# Patient Record
Sex: MALE | Race: BLACK OR AFRICAN AMERICAN
[De-identification: names, ages, dates, MRNs, and addresses within clinical notes are randomized per-mention and may not be internally consistent; named-entity substitution may affect disease eponyms.]

---

## 2020-08-14 ENCOUNTER — HOSPITAL ENCOUNTER (EMERGENCY)
Dept: HOSPITAL 41 - JD.ED | Age: 30
Discharge: HOME | End: 2020-08-14
Payer: SELF-PAY

## 2020-08-14 DIAGNOSIS — M62.830: Primary | ICD-10-CM

## 2020-08-14 PROCEDURE — 71046 X-RAY EXAM CHEST 2 VIEWS: CPT

## 2020-08-14 PROCEDURE — 99283 EMERGENCY DEPT VISIT LOW MDM: CPT

## 2020-08-14 NOTE — CR
Chest: 2 views of the chest were obtained.

 

Comparison: No prior chest imaging is available.

 

Heart size and mediastinum are normal.  Lungs are clear with no acute 

parenchymal change.  Bony structures appear within normal limits.

 

Impression:

1.  Nothing acute is seen on 2 view chest x-ray.

 

Diagnostic code #1

 

This report was dictated in MDT

## 2020-08-14 NOTE — EDM.PDOC
ED HPI GENERAL MEDICAL PROBLEM





- General


Chief Complaint: Back Pain or Injury


Stated Complaint: BACK PAIN  & STOMACH PAIN


Time Seen by Provider: 08/14/20 19:04


Source of Information: Reports: Patient


History Limitations: Reports: No Limitations





- History of Present Illness


INITIAL COMMENTS - FREE TEXT/NARRATIVE: 





Mr. Vega is a very pleasant 30-year-old gentleman with no chronic medical 

problems and no past surgical history, who now presents to the ED stating that 

he has been experiencing mild, intermittent pain felt between his scapula since 

Wednesday, 8/12/2020.  The pain only occurs if he is reaching or, sometimes, 

when he is standing, but not if he is supine or resting.  His pain is not made 

worse if he takes a deep breath.  He also reports mild pain to his upper ab

dominal rectus muscles.  No previous symptoms prior to Wednesday.





The patient reports doing 40 push-ups a day, 5 days a week, which she states he 

has been doing for a long time.  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 

to do some lifting and stocking.





The patient states that he took some ibuprofen last night, which made him feel 

considerably better, however, his pain was back again this morning.





Here in the ED, the patient is found to be hemodynamically stable, afebrile, 

saturating 100% on room air.





Other than his intrascapular and upper abdominal pain, the patient denies having

a recent fever, chills, sore throat, ear pain, nasal or sinus congestion, cough,

dyspnea, chest pain, palpitations, nausea, vomiting, constipation, diarrhea, 

urinary symptoms, recent weight gain or weight loss, recent bloody bowel 

movements or black bowel movements, recent joint aches, headaches, or rashes.








The patient does not have a PCP.








Other Treatments PTA: advil


  ** Upper Back


Pain Score (Numeric/FACES): 6





  ** Upper Abdomen


Pain Score (Numeric/FACES): 2





- Related Data


                                    Allergies











Allergy/AdvReac Type Severity Reaction Status Date / Time


 


No Known Allergies Allergy   Verified 08/14/20 18:31











Home Meds: 


                                    Home Meds





Orphenadrine [Norflex] 1 tab PO Q12H PRN #14 tab.er 08/14/20 [Rx]











Past Medical History





- Past Health History


Medical/Surgical History: Denies Medical/Surgical History





Social & Family History





- Tobacco Use


Smoking Status *Q: Never Smoker





- Caffeine Use


Caffeine Use: Reports: Coffee





- Alcohol Use


Alcohol Use History: Yes


Alcohol Use Frequency: Socially





- Recreational Drug Use


Recreational Drug Use: No





- Living Situation & Occupation


Living situation: Reports: Single, Alone


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





ED ROS GENERAL





- Review of Systems


Review Of Systems: Comprehensive ROS is negative, except as noted in HPI.





ED EXAM, UPPER BACK/NECK PAIN





- Physical Exam


Exam: See Below


Exam Limited By: No Limitations


General Appearance: Alert, WD/WN, No Apparent Distress


Eye Exam: Bilateral Eye: EOMI, Normal Inspection


Ears Exam: Normal External Exam, Hearing Grossly Normal


Nose Exam: Normal Inspection


Throat/Mouth Exam: Normal Inspection, Normal Lips, Normal Voice, No Airway 

Compromise


Head Exam: Atraumatic, Normocephalic


Neck Exam: Non-Tender, Full Range of Motion, Normal Alignment, Normal Inspection


Cardiovascular/Respiratory: Regular Rate, Rhythm, No M/R/G, Normal Peripheral 

Pulses, No JVD, Normal Breath Sounds, No Respiratory Distress


GI/Abdominal: Normal Bowel Sounds, Soft, Non-Tender (including the upper rectus 

muscles), No Organomegaly, No Distention, No Abnormal Bruit, No Mass


 (Male) Exam: Deferred


Rectal (Males) Exam: Deferred


Back Exam: Normal Inspection, Full Range of Motion, Other (The patient is able 

to identify the left and right parascapular musculature as the site of his pain,

 although the muscles are not at present tender).  No: CVA Tenderness (L), CVA 

Tenderness (R), Vertebral Tenderness


Extremities: Normal Inspection, Normal Range of Motion, No Pedal Edema, Normal 

Capillary Refill


Neurologic: No Motor/Sensory Deficits, Alert, Oriented x 3


Psychiatric: Normal Affect


Skin Exam: Normal Color, Warm/Dry





Course





- Vital Signs


Last Recorded V/S: 


                                Last Vital Signs











Temp  36.6 C   08/14/20 18:33


 


Pulse  63   08/14/20 18:33


 


Resp  20   08/14/20 18:33


 


BP  132/91 H  08/14/20 18:33


 


Pulse Ox  100   08/14/20 18:33














- Orders/Labs/Meds


Meds: 


Medications














Discontinued Medications














Generic Name Dose Route Start Last Admin





  Trade Name Freq  PRN Reason Stop Dose Admin


 


Ibuprofen  600 mg  08/14/20 20:55  08/14/20 21:09





  Motrin  PO  08/14/20 20:56  600 mg





  ONETIME ONE   Administration


 


Orphenadrine Citrate  100 mg  08/14/20 20:55  08/14/20 21:09





  Norflex  PO  08/14/20 20:56  100 mg





  ONETIME STA   Administration














- Re-Assessments/Exams


Free Text/Narrative Re-Assessment/Exam: 





08/14/20 19:18


As above, the patient has had 2 days of intermittent parascapular pain with 

various movements, such as reaching.  He is currently asymptomatic, although the

 patient is able to identify the parascapular musculature as the site of his 

pain.  His history is most consistent with a musculoskeletal etiology, however, 

I have ordered a chest x-ray to rule out an anatomic abnormality.  I anticipate 

that will be negative, and if so, I will start the patient on Norflex, to be 

taken with over-the-counter ibuprofen.








08/14/20 20:54


2-view chest x-ray is read by Dr. Alcantara as:


1.  Nothing acute is seen on 2 view chest x-ray.








08/14/20 20:57


Chest x-ray results discussed with the patient.  As above, the patient's pain 

appears to be due to a muscle spasm.  I will start him on Norflex and ibuprofen 

here, and submit a prescription to the pharmacy of his choice, that he can take 

along with over-the-counter ibuprofen.  I expect that he will be feeling all 

better within a couple of days.





Departure





- Departure


Time of Disposition: 20:58


Disposition: Home, Self-Care 01


Condition: Good


Clinical Impression: 


 Back muscle spasm








- Discharge Information


*PRESCRIPTION DRUG MONITORING PROGRAM REVIEWED*: Not Applicable


*COPY OF PRESCRIPTION DRUG MONITORING REPORT IN PATIENT [facility]: Not Applicable


Prescriptions: 


Orphenadrine [Norflex] 1 tab PO Q12H PRN #14 tab.er


 PRN Reason: Muscle Spasm


Instructions:  Muscle Cramps and Spasms, Easy-to-Read


Referrals: 


PCP,None [Primary Care Provider] - 


Forms:  ED Department Discharge


Additional Instructions: 


You were seen in the emergency room for intermittent pain between your shoulder 

blades, particularly with certain motions, such as reaching.





Work-up in the ER included a chest x-ray, which returned completely normal.  You

do not have pneumonia or collapsed lung.





Based on your history, physical exam, and ER chest x-ray, the cause of your back

pain is most likely due to a muscle spasm.





You have been started on the muscle relaxant Norflex, and a prescription for 

Norflex has been sent to the [facility], located just south 

and across the street from [facility].  Take 1 tablet of Norflex every 12 hours, 

starting tomorrow morning, Saturday, 8/15/2020, as prescribed.





Norflex works well with ibuprofen.  We recommend that you take 3 tablets (600 

mg) of over-the-counter ibuprofen up to every 8 hours, with food, as needed for 

discomfort.





If any other problems, please do not hesitate to return to the ER.





Sepsis Event Note (ED)





- Evaluation


Sepsis Screening Result: No Definite Risk





- Focused Exam


Vital Signs: 


                                   Vital Signs











  Temp Pulse Resp BP Pulse Ox


 


 08/14/20 18:33  36.6 C  63  20  132/91 H  100